# Patient Record
Sex: FEMALE | Race: WHITE | NOT HISPANIC OR LATINO | Employment: FULL TIME | URBAN - METROPOLITAN AREA
[De-identification: names, ages, dates, MRNs, and addresses within clinical notes are randomized per-mention and may not be internally consistent; named-entity substitution may affect disease eponyms.]

---

## 2017-08-18 ENCOUNTER — OFFICE VISIT (OUTPATIENT)
Dept: OPTOMETRY | Facility: CLINIC | Age: 62
End: 2017-08-18
Payer: COMMERCIAL

## 2017-08-18 DIAGNOSIS — H43.812 POSTERIOR VITREOUS DETACHMENT, LEFT: Primary | ICD-10-CM

## 2017-08-18 DIAGNOSIS — H40.033 ANATOMICAL NARROW ANGLE, BILATERAL: ICD-10-CM

## 2017-08-18 PROCEDURE — 92004 COMPRE OPH EXAM NEW PT 1/>: CPT | Mod: S$GLB,,, | Performed by: OPTOMETRIST

## 2017-08-18 PROCEDURE — 99999 PR PBB SHADOW E&M-NEW PATIENT-LVL II: CPT | Mod: PBBFAC,,, | Performed by: OPTOMETRIST

## 2017-08-18 NOTE — PATIENT INSTRUCTIONS
FLASHES / FLOATERS / POSTERIOR VITREOUS DETACHMENT    Call the clinic if you have any further changes in symptoms.  Including:  Increased numbers of floaters or flashing lights, dimness or darkness that moves through or stays constant in your vision, or any pain in the eye (s).            NARROW ANTERIOR CHAMBER ANGLE    The anterior chamber angle is the measured distance from the back surface of the cornea (the clear surface of the eye), to the iris (the color part of the eye).  Fluids that are normally produced inside the eye drain out through this so called angle. If the chamber angle is very narrow, it can impede this fluid outflow, and cause the eye pressure to rise.  This could lead to one type of glaucoma, Narrow Angle Glaucoma.    A test used to measure the angle depth is called GONIOSCOPY.  An instrument with a reflecting mirror is placed on the front of the eye to view the angle.     Very rarely, patients with narrow angle can have a sudden rise in eye pressure.  Symptoms of Acute Narrow Angle Glaucoma could include: intense ache/ pain, deep redness, cloudy / foggy/ blurred vision, headache and nausea.  Contact our office if you should ever experience any of these symptoms.

## 2017-08-18 NOTE — PROGRESS NOTES
HPI     Spots and/or Floaters    Additional comments: pt seeing brown spot OS x 1 month -- no light   flashes, eye pain or blurred VA             Comments   New onset floater OS x 4+ weeks  No flashes  No pain   Does move w/ eye movement       Last edited by IZABELA Cartagena, OD on 8/18/2017 10:36 AM. (History)        ROS     Positive for: Eyes    Negative for: Constitutional, Gastrointestinal, Neurological, Skin,   Genitourinary, Musculoskeletal, HENT, Endocrine, Cardiovascular,   Respiratory, Psychiatric, Allergic/Imm, Heme/Lymph    Last edited by IZABELA Cartagena, OD on 8/18/2017 10:36 AM. (History)        Assessment /Plan     For exam results, see Encounter Report.    Posterior vitreous detachment, left    Anatomical narrow angle, bilateral      1. > 4 weeks duration, no RD or tear noted OS  Gave RD precautions and reviewed  2. Narrow OU--slit angle on 4 mirror  Gave info on ACG s/s  Discussed LPI, pt defers consult at this time---can schedule per convenience    Discussed and educated patient on current findings /plan.  RTC 1 year, prn if any changes / issues